# Patient Record
Sex: FEMALE | Race: WHITE | NOT HISPANIC OR LATINO | Employment: PART TIME | URBAN - METROPOLITAN AREA
[De-identification: names, ages, dates, MRNs, and addresses within clinical notes are randomized per-mention and may not be internally consistent; named-entity substitution may affect disease eponyms.]

---
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
## 2020-02-12 ENCOUNTER — OFFICE VISIT (OUTPATIENT)
Dept: OBSTETRICS AND GYNECOLOGY | Facility: CLINIC | Age: 20
End: 2020-02-12

## 2020-02-12 VITALS
DIASTOLIC BLOOD PRESSURE: 89 MMHG | WEIGHT: 246 LBS | HEART RATE: 104 BPM | BODY MASS INDEX: 34.44 KG/M2 | HEIGHT: 71 IN | SYSTOLIC BLOOD PRESSURE: 124 MMHG

## 2020-02-12 DIAGNOSIS — Z01.419 WELL WOMAN EXAM WITH ROUTINE GYNECOLOGICAL EXAM: Primary | ICD-10-CM

## 2020-02-12 LAB
B-HCG UR QL: NEGATIVE
INTERNAL NEGATIVE CONTROL: NEGATIVE
INTERNAL POSITIVE CONTROL: POSITIVE
Lab: NORMAL

## 2020-02-12 PROCEDURE — 81025 URINE PREGNANCY TEST: CPT | Performed by: OBSTETRICS & GYNECOLOGY

## 2020-02-12 PROCEDURE — 99395 PREV VISIT EST AGE 18-39: CPT | Performed by: OBSTETRICS & GYNECOLOGY

## 2020-02-12 RX ORDER — NORETHINDRONE ACETATE AND ETHINYL ESTRADIOL AND FERROUS FUMARATE 1MG-20(24)
1 KIT ORAL DAILY
Qty: 84 TABLET | Refills: 4 | Status: SHIPPED | OUTPATIENT
Start: 2020-02-12 | End: 2021-02-11

## 2020-02-12 NOTE — PROGRESS NOTES
Chief Complaint   Patient presents with   • Annual Exam        Sharita Leroy is a 19 y.o.  who presents for an annual examination.     Pap history:  Last pap: N/A  Prior abnormal paps: no  STDs  Sexually active: no  History of STDs: no  Has had HPV vaccine: yes  Contraception:  None currently. Patient's last menstrual period was 2019 (within days).   Denies h/o migraine with aura, VTE, family history of clotting disorder, no hypertension  Would like to restart [facility]    Screening for BRCA-   Is patient's family history significant for BRCA risk factors? no    Past Medical History:   Diagnosis Date   • Polycystic ovary syndrome      Past Surgical History:   Procedure Laterality Date   • ADENOIDECTOMY     • TONSILLECTOMY     • WISDOM TOOTH EXTRACTION       OB History    Para Term  AB Living   0 0 0 0 0 0   SAB TAB Ectopic Molar Multiple Live Births   0 0 0   0        Social History     Tobacco Use   • Smoking status: Never Smoker   • Smokeless tobacco: Never Used   Substance Use Topics   • Alcohol use: No   • Drug use: No     Family History   Problem Relation Age of Onset   • No Known Problems Father    • No Known Problems Mother    • No Known Problems Brother    • No Known Problems Sister    • No Known Problems Paternal Grandfather    • Breast cancer Paternal Grandmother         84   • No Known Problems Maternal Grandmother    • No Known Problems Maternal Grandfather    • Ovarian cancer Neg Hx    • Uterine cancer Neg Hx    • Colon cancer Neg Hx    • Deep vein thrombosis Neg Hx    • Pulmonary embolism Neg Hx      Current Outpatient Medications on File Prior to Visit   Medication Sig Dispense Refill   • [DISCONTINUED] Norethin Ace-Eth Estrad-FE (TAYTULLA) 1-20 MG-MCG(24) capsule Take 1 tablet by mouth Daily. 90 capsule 3     No current facility-administered medications on file prior to visit.      No Known Allergies     Review of Systems   Constitutional: Negative.    HENT: Negative.   "  Respiratory: Negative.    Cardiovascular: Negative.    Gastrointestinal: Negative.    Endocrine: Negative.    Genitourinary: Positive for menstrual problem. Hematuria: absent since June.   Musculoskeletal: Negative.    Skin: Negative.    Neurological: Negative.    Psychiatric/Behavioral: Negative.          OBJECTIVE:   Vitals:    02/12/20 1543   BP: 124/89   Pulse: 104   Weight: 112 kg (246 lb)   Height: 180.3 cm (71\")      Physical Exam   Constitutional: She is oriented to person, place, and time. She appears well-developed and well-nourished. No distress.   HENT:   Head: Normocephalic and atraumatic.   Eyes: EOM are normal. No scleral icterus.   Neck: Normal range of motion.   Cardiovascular: Normal rate and regular rhythm.   Pulmonary/Chest: Effort normal. No respiratory distress.   Abdominal: Soft. She exhibits no distension.   Musculoskeletal: Normal range of motion.   Neurological: She is alert and oriented to person, place, and time.   Skin: Skin is warm and dry. No rash noted. She is not diaphoretic. No erythema.   Psychiatric: She has a normal mood and affect. Her behavior is normal. Judgment and thought content normal.       ASSESSMENT/PLAN:     Annual well woman exam:  Cervical cancer screening:    Denies cervical dysplasia in past   HPV vaccination completed   The patient is due for a pap at age 20.    Screening guidelines discussed with patient  Breast cancer screening:    Clinical breast exam recommended for age 20-39 years every 1-3 years   Mammogram recommended starting age 40    Breast self awareness encouraged  STD Screening   Testing declines.    Contraception :   Desires oral contraceptive pill for menstrual regulation     Counseled on risks including but not limited to Nausea, vomiting, hypertension, headache, increased risk of venous thromboembolism.  She was encouraged if a side effect is arise she should stop the medication and seek medical attention.  Aware of safe sex precautions, what to " do with missed pills.   Family history    does not demonstrate need for genetics referral   Healthy lifestyle counseling:   return for routine annual checkups    BMI Counseling  Her BMI is classified as BMI 30.0-34.9        Classification: class I obesity.  Counseled on weight and its effects on menstrual cycle diane in women with PCOS.  Goal for 5% weight loss in next 3-4 months.         No follow-ups on file.

## 2021-02-16 ENCOUNTER — OFFICE VISIT (OUTPATIENT)
Dept: OBSTETRICS AND GYNECOLOGY | Facility: CLINIC | Age: 21
End: 2021-02-16

## 2021-02-16 VITALS
SYSTOLIC BLOOD PRESSURE: 131 MMHG | HEART RATE: 89 BPM | HEIGHT: 69 IN | DIASTOLIC BLOOD PRESSURE: 89 MMHG | BODY MASS INDEX: 33.92 KG/M2 | WEIGHT: 229 LBS

## 2021-02-16 DIAGNOSIS — Z01.419 WELL WOMAN EXAM WITH ROUTINE GYNECOLOGICAL EXAM: Primary | ICD-10-CM

## 2021-02-16 PROCEDURE — 99395 PREV VISIT EST AGE 18-39: CPT | Performed by: OBSTETRICS & GYNECOLOGY

## 2021-02-16 RX ORDER — ACETAMINOPHEN AND CODEINE PHOSPHATE 120; 12 MG/5ML; MG/5ML
1 SOLUTION ORAL DAILY
Qty: 84 TABLET | Refills: 4 | Status: SHIPPED | OUTPATIENT
Start: 2021-02-16 | End: 2022-02-22 | Stop reason: SDUPTHER

## 2021-02-16 NOTE — PROGRESS NOTES
Chief Complaint   Patient presents with   • Annual Exam        Sharita Leroy is a 20 y.o.  who presents for an annual examination.     Pap history:  Last pap: N/A  Prior abnormal paps: no  STDs  Sexually active: no  History of STDs: no  Has had HPV vaccine: yes  Contraception:   Patient's last menstrual period was 2021 (exact date).   Denies h/o migraine with aura, VTE, family history of clotting disorder, no hypertension  She is on PoP and would like to continue    Screening for BRCA-   Is patient's family history significant for BRCA risk factors? no    Past Medical History:   Diagnosis Date   • Polycystic ovary syndrome      Past Surgical History:   Procedure Laterality Date   • ADENOIDECTOMY     • TONSILLECTOMY     • WISDOM TOOTH EXTRACTION       OB History    Para Term  AB Living   0 0 0 0 0 0   SAB TAB Ectopic Molar Multiple Live Births   0 0 0   0        Social History     Tobacco Use   • Smoking status: Never Smoker   • Smokeless tobacco: Never Used   Substance Use Topics   • Alcohol use: Yes     Comment: sometimes   • Drug use: No     Family History   Problem Relation Age of Onset   • No Known Problems Father    • No Known Problems Mother    • No Known Problems Brother    • No Known Problems Sister    • No Known Problems Paternal Grandfather    • Breast cancer Paternal Grandmother         84   • No Known Problems Maternal Grandmother    • No Known Problems Maternal Grandfather    • Ovarian cancer Neg Hx    • Uterine cancer Neg Hx    • Colon cancer Neg Hx    • Deep vein thrombosis Neg Hx    • Pulmonary embolism Neg Hx      Current Outpatient Medications on File Prior to Visit   Medication Sig Dispense Refill   • [DISCONTINUED] NORETHINDRONE PO Take  by mouth.       No current facility-administered medications on file prior to visit.      No Known Allergies     Review of Systems   Constitutional: Negative.    HENT: Negative.    Respiratory: Negative.    Cardiovascular: Negative.   "  Gastrointestinal: Negative.    Endocrine: Negative.    Genitourinary: Negative for menstrual problem.   Musculoskeletal: Negative.    Skin: Negative.    Neurological: Negative.    Psychiatric/Behavioral: Negative.          OBJECTIVE:   Vitals:    02/16/21 1348   BP: 131/89   Pulse: 89   Weight: 104 kg (229 lb)   Height: 175.3 cm (69\")      Physical Exam   Constitutional: She is oriented to person, place, and time. She appears well-developed and well-nourished. No distress.   HENT:   Head: Normocephalic and atraumatic.   Eyes: No scleral icterus.   Neck: Normal range of motion.   Cardiovascular: Normal rate and regular rhythm.   Pulmonary/Chest: Effort normal. No respiratory distress.   Abdominal: Soft. She exhibits no distension.   Genitourinary: Vagina normal, uterus normal and cervix normal. There is no rash, tenderness or lesion on the right labia. There is no rash, tenderness or lesion on the left labia. Right adnexum displays no mass. Left adnexum displays no mass.   Musculoskeletal: Normal range of motion.   Neurological: She is alert and oriented to person, place, and time.   Skin: Skin is warm and dry. No rash noted. She is not diaphoretic. No erythema.   Psychiatric: Her behavior is normal. Judgment and thought content normal.       ASSESSMENT/PLAN:     Annual well woman exam:  Cervical cancer screening:    Denies cervical dysplasia in past   HPV vaccination completed   The patient is due for a pap today     Screening guidelines discussed with patient  Breast cancer screening:    Clinical breast exam recommended for age 20-39 years every 1-3 years   Mammogram recommended starting age 40    Breast self awareness encouraged  STD Screening   Testing declines.    Contraception :   Desires to continue minipill, having regular period.  Lasts 4-5 days  Family history    does not demonstrate need for genetics referral   Healthy lifestyle counseling:   return for routine annual checkups    BMI Counseling  Her BMI " is classified as BMI 30.0-34.9        Classification: class I obesity.  Counseled on weight and its effects on menstrual cycle diane in women with PCOS. She lost 15lbs since last annual. Congratulated on weight loss.  She reports improved her diet and has started exercising.        Return in about 1 year (around 2/16/2022) for Annual physical.

## 2021-02-19 LAB
CONV .: NORMAL
CYTOLOGIST CVX/VAG CYTO: NORMAL
CYTOLOGY CVX/VAG DOC CYTO: NORMAL
CYTOLOGY CVX/VAG DOC THIN PREP: NORMAL
DX ICD CODE: NORMAL
HIV 1 & 2 AB SER-IMP: NORMAL
OTHER STN SPEC: NORMAL
STAT OF ADQ CVX/VAG CYTO-IMP: NORMAL

## 2021-02-25 ENCOUNTER — TELEPHONE (OUTPATIENT)
Dept: OBSTETRICS AND GYNECOLOGY | Facility: CLINIC | Age: 21
End: 2021-02-25

## 2021-02-25 NOTE — TELEPHONE ENCOUNTER
----- Message from Ale Reynolds MD sent at 2/23/2021  9:00 AM EST -----  Please contact patient and let her know that her pap smear was normal. Thanks!    02/25/21  Called patient to inform results, no answer. Left a message to return call.    Pt should expect pap results from [facility] by mail.       -Closing encounter

## 2021-04-13 ENCOUNTER — TELEPHONE (OUTPATIENT)
Dept: OBSTETRICS AND GYNECOLOGY | Facility: CLINIC | Age: 21
End: 2021-04-13

## 2021-04-14 NOTE — TELEPHONE ENCOUNTER
04/14/21  Called regarding a question of her birth control (see doctor's message). No answer, left msg to return call.    04/16/21  Called regarding a question of her birth control (see doctor's message). No answer, left msg to return call.    04/19/21  When calling a female voice answered and stated this was a wrong number I gave her the number.

## 2022-02-22 ENCOUNTER — OFFICE VISIT (OUTPATIENT)
Dept: OBSTETRICS AND GYNECOLOGY | Facility: CLINIC | Age: 22
End: 2022-02-22

## 2022-02-22 VITALS
DIASTOLIC BLOOD PRESSURE: 86 MMHG | SYSTOLIC BLOOD PRESSURE: 146 MMHG | WEIGHT: 238 LBS | HEIGHT: 70 IN | BODY MASS INDEX: 34.07 KG/M2 | HEART RATE: 104 BPM

## 2022-02-22 DIAGNOSIS — Z01.419 WELL WOMAN EXAM WITH ROUTINE GYNECOLOGICAL EXAM: Primary | ICD-10-CM

## 2022-02-22 PROBLEM — E28.2 PCOS (POLYCYSTIC OVARIAN SYNDROME): Status: ACTIVE | Noted: 2017-07-24

## 2022-02-22 PROCEDURE — 99395 PREV VISIT EST AGE 18-39: CPT | Performed by: OBSTETRICS & GYNECOLOGY

## 2022-02-22 RX ORDER — ACETAMINOPHEN AND CODEINE PHOSPHATE 120; 12 MG/5ML; MG/5ML
1 SOLUTION ORAL DAILY
Qty: 84 TABLET | Refills: 4 | Status: SHIPPED | OUTPATIENT
Start: 2022-02-22 | End: 2022-04-18 | Stop reason: SDUPTHER

## 2022-02-22 NOTE — PROGRESS NOTES
Chief Complaint   Patient presents with   • Gynecologic Exam     here for annual exam.        Sharita Leroy is a 21 y.o.  who presents for an annual examination.     Pap history:  Last pap: 2021 NIL  Prior abnormal paps: no  STDs  Sexually active: no  History of STDs: no  Has had HPV vaccine: yes   Contraception:   Patient's last menstrual period was 2022.   Denies h/o migraine with aura, VTE, family history of clotting disorder, no hypertension  She is on PoP and would like to continue    Screening for BRCA-   Is patient's family history significant for BRCA risk factors? no    Past Medical History:   Diagnosis Date   • Polycystic ovary syndrome      Past Surgical History:   Procedure Laterality Date   • ADENOIDECTOMY     • TONSILLECTOMY     • WISDOM TOOTH EXTRACTION       OB History    Para Term  AB Living   0 0 0 0 0 0   SAB IAB Ectopic Molar Multiple Live Births   0 0 0   0        Social History     Tobacco Use   • Smoking status: Never Smoker   • Smokeless tobacco: Never Used   Substance Use Topics   • Alcohol use: Yes     Comment: sometimes   • Drug use: No     Family History   Problem Relation Age of Onset   • No Known Problems Father    • No Known Problems Mother    • No Known Problems Brother    • No Known Problems Sister    • No Known Problems Paternal Grandfather    • Breast cancer Paternal Grandmother         84   • No Known Problems Maternal Grandmother    • No Known Problems Maternal Grandfather    • Ovarian cancer Neg Hx    • Uterine cancer Neg Hx    • Colon cancer Neg Hx    • Deep vein thrombosis Neg Hx    • Pulmonary embolism Neg Hx      Current Outpatient Medications on File Prior to Visit   Medication Sig Dispense Refill   • [DISCONTINUED] norethindrone (MICRONOR) 0.35 MG tablet Take 1 tablet by mouth Daily. 84 tablet 4     No current facility-administered medications on file prior to visit.     No Known Allergies     Review of Systems   Constitutional: Negative.   "  HENT: Negative.    Respiratory: Negative.    Cardiovascular: Negative.    Gastrointestinal: Negative.    Endocrine: Negative.    Genitourinary: Negative for menstrual problem.   Musculoskeletal: Negative.    Skin: Negative.    Neurological: Negative.    Psychiatric/Behavioral: Negative.          OBJECTIVE:   Vitals:    02/22/22 0959 02/22/22 1036   BP: 148/96 146/86   Pulse: 97 104   Weight: 108 kg (238 lb)    Height: 177.8 cm (70\")       Physical Exam   Constitutional: She is oriented to person, place, and time. She appears well-developed and well-nourished. No distress.   HENT:   Head: Normocephalic and atraumatic.   Eyes: No scleral icterus.   Cardiovascular: Normal rate and regular rhythm.   Pulmonary/Chest: Effort normal. No respiratory distress.   Abdominal: Soft. She exhibits no distension.   Genitourinary: Vagina normal, uterus normal and cervix normal. There is no rash, tenderness or lesion on the right labia. There is no rash, tenderness or lesion on the left labia. Right adnexum displays no mass. Left adnexum displays no mass.   Musculoskeletal: Normal range of motion.   Neurological: She is alert and oriented to person, place, and time.   Skin: Skin is warm and dry. No rash noted. She is not diaphoretic. No erythema.   Psychiatric: Her behavior is normal. Judgment and thought content normal.       ASSESSMENT/PLAN:     Annual well woman exam:  Cervical cancer screening:    Denies cervical dysplasia in past   HPV vaccination completed   The patient is due for a pap in 2024    Screening guidelines discussed with patient  Breast cancer screening:    Clinical breast exam recommended for age 20-39 years every 1-3 years   Mammogram recommended starting age 40    Breast self awareness encouraged  STD Screening   Testing declines.    Contraception :   Desires to continue minipill, having regular period most of the time - this most recent period has lasted 7 days   Family history    does not demonstrate need for " genetics referral   Healthy lifestyle counseling:   return for routine annual checkups   HTN: Encouraged to establish with PCP     BMI Counseling  Her BMI is classified as BMI 30.0-34.9        Classification: class I obesity.  Counseled on weight and its effects on menstrual cycle diane in women with PCOS. She lost 7lbs since last annual. Congratulated on weight loss.        Return in about 1 year (around 2/22/2023) for Annual physical.

## 2022-04-18 RX ORDER — ACETAMINOPHEN AND CODEINE PHOSPHATE 120; 12 MG/5ML; MG/5ML
1 SOLUTION ORAL DAILY
Qty: 84 TABLET | Refills: 4 | Status: SHIPPED | OUTPATIENT
Start: 2022-04-18 | End: 2022-07-22

## 2022-07-22 ENCOUNTER — OFFICE VISIT (OUTPATIENT)
Dept: OBSTETRICS AND GYNECOLOGY | Facility: CLINIC | Age: 22
End: 2022-07-22

## 2022-07-22 VITALS
BODY MASS INDEX: 33.21 KG/M2 | SYSTOLIC BLOOD PRESSURE: 126 MMHG | HEART RATE: 76 BPM | HEIGHT: 70 IN | DIASTOLIC BLOOD PRESSURE: 89 MMHG | WEIGHT: 232 LBS

## 2022-07-22 DIAGNOSIS — E28.2 PCOS (POLYCYSTIC OVARIAN SYNDROME): Primary | ICD-10-CM

## 2022-07-22 DIAGNOSIS — N92.6 IRREGULAR MENSES: ICD-10-CM

## 2022-07-22 LAB
B-HCG UR QL: NEGATIVE
BILIRUB BLD-MCNC: NEGATIVE MG/DL
CLARITY, POC: ABNORMAL
COLOR UR: YELLOW
EXPIRATION DATE: NORMAL
GLUCOSE UR STRIP-MCNC: NEGATIVE MG/DL
INTERNAL NEGATIVE CONTROL: NEGATIVE
INTERNAL POSITIVE CONTROL: POSITIVE
KETONES UR QL: NEGATIVE
LEUKOCYTE EST, POC: NEGATIVE
Lab: NORMAL
NITRITE UR-MCNC: NEGATIVE MG/ML
PH UR: 7 [PH] (ref 5–8)
PROT UR STRIP-MCNC: NEGATIVE MG/DL
RBC # UR STRIP: ABNORMAL /UL
SP GR UR: 1.02 (ref 1–1.03)
UROBILINOGEN UR QL: NORMAL

## 2022-07-22 PROCEDURE — 81025 URINE PREGNANCY TEST: CPT | Performed by: OBSTETRICS & GYNECOLOGY

## 2022-07-22 PROCEDURE — 99213 OFFICE O/P EST LOW 20 MIN: CPT | Performed by: OBSTETRICS & GYNECOLOGY

## 2022-07-22 PROCEDURE — 81002 URINALYSIS NONAUTO W/O SCOPE: CPT | Performed by: OBSTETRICS & GYNECOLOGY

## 2022-07-22 RX ORDER — NORETHINDRONE ACETATE AND ETHINYL ESTRADIOL AND FERROUS FUMARATE 1MG-20(24)
1 KIT ORAL DAILY
Qty: 28 TABLET | Refills: 1 | Status: SHIPPED | OUTPATIENT
Start: 2022-07-22 | End: 2022-08-22

## 2022-07-22 NOTE — PROGRESS NOTES
SUBJECTIVE:   Chief Complaint   Patient presents with   • Follow-up     Pt presents today for irregular cycles         Sharita Leroy is a 22 y.o.  who presents for irregular cycles since around March. She seems to get them with only a four day break in between. They are pretty heavy: on heaviest day she has to change her tampon every 2 hours or 3 hours.  Denies bleeding through at night.  First two days she has heavier bleeding. Typically the bleeding lasts 5-6 days and then 3-4 days off and then restarts.      +crampign with last two periods.  This week it was pretty bad.  Takes ibuprofen and helps a little.  She is still on Micronor.  She was on Loestin in  and then PoP in     Never before sexually active.  No vaginal discharge.   She was told she had PCOS due to oligomenorrhea.  Denies any abnormal hair growth on face, some hair thinning.  Acne is worse.      Past Medical History:   Diagnosis Date   • Polycystic ovary syndrome      Past Surgical History:   Procedure Laterality Date   • ADENOIDECTOMY     • TONSILLECTOMY     • WISDOM TOOTH EXTRACTION       OB History    Para Term  AB Living   0 0 0 0 0 0   SAB IAB Ectopic Molar Multiple Live Births   0 0 0   0        Social History     Tobacco Use   • Smoking status: Never Smoker   • Smokeless tobacco: Never Used   Substance Use Topics   • Alcohol use: Yes     Comment: sometimes   • Drug use: No     Family History   Problem Relation Age of Onset   • No Known Problems Father    • No Known Problems Mother    • No Known Problems Brother    • No Known Problems Sister    • No Known Problems Paternal Grandfather    • Breast cancer Paternal Grandmother         84   • No Known Problems Maternal Grandmother    • No Known Problems Maternal Grandfather    • Ovarian cancer Neg Hx    • Uterine cancer Neg Hx    • Colon cancer Neg Hx    • Deep vein thrombosis Neg Hx    • Pulmonary embolism Neg Hx      Current Outpatient Medications on File Prior to  "Visit   Medication Sig Dispense Refill   • [DISCONTINUED] norethindrone (MICRONOR) 0.35 MG tablet Take 1 tablet by mouth Daily. 84 tablet 4     No current facility-administered medications on file prior to visit.     No Known Allergies     Review of Systems      OBJECTIVE:   Vitals:    07/22/22 1012   BP: 126/89   Pulse: 76   Weight: 105 kg (232 lb)   Height: 177.8 cm (70\")      Physical Exam  Exam conducted with a chaperone present.   Constitutional:       Appearance: She is well-developed.   HENT:      Head: Normocephalic and atraumatic.   Eyes:      General: No scleral icterus.  Cardiovascular:      Rate and Rhythm: Normal rate.   Pulmonary:      Effort: Pulmonary effort is normal. No respiratory distress.   Abdominal:      General: There is no distension.      Palpations: Abdomen is soft.      Tenderness: There is no abdominal tenderness. There is no guarding.   Genitourinary:     Labia:         Right: No rash, tenderness or lesion.         Left: No rash, tenderness or lesion.       Vagina: Vaginal discharge (dark brown, scant) present. No bleeding or lesions.      Cervix: No cervical motion tenderness or friability.      Uterus: Normal. Not enlarged and not tender.       Adnexa: Right adnexa normal.        Right: No mass or tenderness.          Left: No mass or tenderness.     Musculoskeletal:      Cervical back: Normal range of motion.   Skin:     General: Skin is warm and dry.   Neurological:      Mental Status: She is alert and oriented to person, place, and time.   Psychiatric:         Behavior: Behavior normal.         ASSESSMENT/PLAN:     ICD-10-CM ICD-9-CM   1. PCOS (polycystic ovarian syndrome)  E28.2 256.4   2. Irregular menses  N92.6 626.4       Reviewed possible causes of AUB  She was on PoP bc of BP which is within range today. Would like to do trial of CHC as bleeding has been so frequent. This is reasonable for one cycle and will have close BP follow up (est with PCP, too) next month with US with " us  Bleeding and pain precautions reviewed.     Orders Placed This Encounter   Procedures   • US Non-ob Transvaginal     Standing Status:   Future     Standing Expiration Date:   7/22/2023     Order Specific Question:   Reason for Exam:     Answer:   AUB, possible PCO   • TSH Rfx On Abnormal To Free T4     Order Specific Question:   Release to patient     Answer:   Immediate   • Follicle Stimulating Hormone     Order Specific Question:   Release to patient     Answer:   Immediate   • Estradiol     Order Specific Question:   Release to patient     Answer:   Immediate   • Testosterone - total     Order Specific Question:   Release to patient     Answer:   Immediate   • DHEA-Sulfate     Order Specific Question:   Release to patient     Answer:   Immediate   • Hemoglobin A1c     Order Specific Question:   Release to patient     Answer:   Immediate   • CBC (No Diff)     Order Specific Question:   Release to patient     Answer:   Immediate   • POC Urinalysis Dipstick     Order Specific Question:   Release to patient     Answer:   Immediate   • POC Pregnancy, Urine     Order Specific Question:   Release to patient     Answer:   Immediate       Return for Recheck with GYN US, GYN visit.

## 2022-07-23 LAB
DHEA-S SERPL-MCNC: 280 UG/DL (ref 110–431.7)
ERYTHROCYTE [DISTWIDTH] IN BLOOD BY AUTOMATED COUNT: 12.7 % (ref 11.7–15.4)
ESTRADIOL SERPL-MCNC: 19 PG/ML
FSH SERPL-ACNC: 6.5 MIU/ML
HBA1C MFR BLD: 5.8 % (ref 4.8–5.6)
HCT VFR BLD AUTO: 41.5 % (ref 34–46.6)
HGB BLD-MCNC: 13.5 G/DL (ref 11.1–15.9)
MCH RBC QN AUTO: 29.4 PG (ref 26.6–33)
MCHC RBC AUTO-ENTMCNC: 32.5 G/DL (ref 31.5–35.7)
MCV RBC AUTO: 90 FL (ref 79–97)
PLATELET # BLD AUTO: 323 X10E3/UL (ref 150–450)
RBC # BLD AUTO: 4.59 X10E6/UL (ref 3.77–5.28)
TESTOST SERPL-MCNC: 31 NG/DL (ref 13–71)
TSH SERPL DL<=0.005 MIU/L-ACNC: 2.75 UIU/ML (ref 0.45–4.5)
WBC # BLD AUTO: 10 X10E3/UL (ref 3.4–10.8)

## 2022-07-25 ENCOUNTER — TELEPHONE (OUTPATIENT)
Dept: OBSTETRICS AND GYNECOLOGY | Facility: CLINIC | Age: 22
End: 2022-07-25

## 2022-07-25 NOTE — TELEPHONE ENCOUNTER
----- Message from Ale Reynolds MD sent at 7/25/2022  9:11 AM EDT -----  Please let patient know that her testosterone, estradiol, FSH, and thyroid testing were within normal limits.  Her A1C was higher than 5 years ago at 5.8, in the prediabetic range. I would recommend follow up with her PCP for discussion of dietary/lifestyle changes and to continue to monitor this.  We can also discuss more at her follow up on 8/22. Thanks!

## 2022-08-22 ENCOUNTER — OFFICE VISIT (OUTPATIENT)
Dept: OBSTETRICS AND GYNECOLOGY | Facility: CLINIC | Age: 22
End: 2022-08-22

## 2022-08-22 VITALS
HEIGHT: 70 IN | SYSTOLIC BLOOD PRESSURE: 142 MMHG | DIASTOLIC BLOOD PRESSURE: 91 MMHG | BODY MASS INDEX: 33.44 KG/M2 | HEART RATE: 85 BPM | WEIGHT: 233.6 LBS

## 2022-08-22 DIAGNOSIS — E28.2 PCOS (POLYCYSTIC OVARIAN SYNDROME): ICD-10-CM

## 2022-08-22 DIAGNOSIS — R03.0 ELEVATED BLOOD PRESSURE READING: ICD-10-CM

## 2022-08-22 DIAGNOSIS — N93.9 ABNORMAL UTERINE BLEEDING (AUB): Primary | ICD-10-CM

## 2022-08-22 DIAGNOSIS — R73.09 ELEVATED HEMOGLOBIN A1C: ICD-10-CM

## 2022-08-22 PROCEDURE — 99213 OFFICE O/P EST LOW 20 MIN: CPT | Performed by: OBSTETRICS & GYNECOLOGY

## 2022-08-22 RX ORDER — ACETAMINOPHEN AND CODEINE PHOSPHATE 120; 12 MG/5ML; MG/5ML
1 SOLUTION ORAL DAILY
Qty: 84 TABLET | Refills: 4 | Status: SHIPPED | OUTPATIENT
Start: 2022-08-22 | End: 2023-08-22

## 2022-08-22 NOTE — PROGRESS NOTES
"SUBJECTIVE:   Chief Complaint   Patient presents with   • Follow-up     Pt presents today to go over u/s results         Sharita Leroy is a 22 y.o.  who presents for AUB. Reports she has had better bleeding - but still slightly irregular.  Took CHC for last month.  Has follow up with PCP this week, Dr. Dmitri Lemus at [facility]    Past Medical History:   Diagnosis Date   • Polycystic ovary syndrome      Past Surgical History:   Procedure Laterality Date   • ADENOIDECTOMY     • TONSILLECTOMY     • WISDOM TOOTH EXTRACTION       OB History    Para Term  AB Living   0 0 0 0 0 0   SAB IAB Ectopic Molar Multiple Live Births   0 0 0   0        Social History     Tobacco Use   • Smoking status: Never Smoker   • Smokeless tobacco: Never Used   Substance Use Topics   • Alcohol use: Yes     Comment: sometimes   • Drug use: No     Family History   Problem Relation Age of Onset   • No Known Problems Father    • No Known Problems Mother    • No Known Problems Brother    • No Known Problems Sister    • No Known Problems Paternal Grandfather    • Breast cancer Paternal Grandmother         84   • No Known Problems Maternal Grandmother    • No Known Problems Maternal Grandfather    • Ovarian cancer Neg Hx    • Uterine cancer Neg Hx    • Colon cancer Neg Hx    • Deep vein thrombosis Neg Hx    • Pulmonary embolism Neg Hx      Current Outpatient Medications on File Prior to Visit   Medication Sig Dispense Refill   • [DISCONTINUED] norethindrone-ethinyl estradiol-ferrous fumarate (LOESTIN 24 FE) 1-20 MG-MCG(24) per tablet Take 1 tablet by mouth Daily. 28 tablet 1     No current facility-administered medications on file prior to visit.     No Known Allergies     Review of Systems      OBJECTIVE:   Vitals:    22 1001   BP: 142/91   Pulse: 85   Weight: 106 kg (233 lb 9.6 oz)   Height: 177.8 cm (70\")      Physical Exam  Constitutional:       General: She is not in acute distress.     Appearance: She is " well-developed. She is not diaphoretic.   HENT:      Head: Normocephalic and atraumatic.   Eyes:      General: No scleral icterus.     Extraocular Movements: Extraocular movements intact.   Pulmonary:      Effort: Pulmonary effort is normal. No respiratory distress.   Skin:     General: Skin is warm and dry.   Neurological:      General: No focal deficit present.      Mental Status: She is alert and oriented to person, place, and time.   Psychiatric:         Mood and Affect: Mood normal.         Behavior: Behavior normal.         Thought Content: Thought content normal.         Judgment: Judgment normal.         ASSESSMENT/PLAN:     ICD-10-CM ICD-9-CM   1. Abnormal uterine bleeding (AUB)  N93.9 626.9   2. Elevated blood pressure reading  R03.0 796.2   3. Elevated hemoglobin A1c  R73.09 790.29   4. PCOS (polycystic ovarian syndrome)  E28.2 256.4       Reviewed US from today which suggests PCO appearing ovaries. In conjunction with her irregular cycle, PCOS is likely cause of AUB. We reviewed the implications of this.  She has had some elevations in BP and we tried a CHC again, but since her BP is elevated will switch back to PoP. Reviewed options for progestin only contraceptions and effects on bleeding including use of IUDs, Nexplanon. If she desires IUD will contact us  Reviewed use of metformin which may be helpful for A1C as well as cycle. As she has an appt with PCP this week will defer till that visit, but encouraged her to reach back out to us with questions.    Follow up with heavy or continued irregular bleeding. Can consider Provera withdrawal in between Micronor course as well      No orders of the defined types were placed in this encounter.      Return in about 6 months (around 2/22/2023).

## 2022-09-19 RX ORDER — NORETHINDRONE ACETATE AND ETHINYL ESTRADIOL, AND FERROUS FUMARATE 1MG-20(24)
KIT ORAL
Qty: 28 TABLET | Refills: 1 | OUTPATIENT
Start: 2022-09-19

## 2023-02-24 ENCOUNTER — OFFICE VISIT (OUTPATIENT)
Dept: OBSTETRICS AND GYNECOLOGY | Facility: CLINIC | Age: 23
End: 2023-02-24
Payer: COMMERCIAL

## 2023-02-24 VITALS
WEIGHT: 224.2 LBS | SYSTOLIC BLOOD PRESSURE: 130 MMHG | BODY MASS INDEX: 32.1 KG/M2 | DIASTOLIC BLOOD PRESSURE: 87 MMHG | HEIGHT: 70 IN | HEART RATE: 93 BPM

## 2023-02-24 DIAGNOSIS — Z01.419 WELL WOMAN EXAM WITH ROUTINE GYNECOLOGICAL EXAM: Primary | ICD-10-CM

## 2023-02-24 DIAGNOSIS — R73.09 ELEVATED HEMOGLOBIN A1C: ICD-10-CM

## 2023-02-24 LAB — HBA1C MFR BLD: 5.2 % (ref 4.8–5.6)

## 2023-02-24 PROCEDURE — 99395 PREV VISIT EST AGE 18-39: CPT | Performed by: OBSTETRICS & GYNECOLOGY

## 2023-02-24 NOTE — PROGRESS NOTES
Chief Complaint   Patient presents with   • Gynecologic Exam        Sharita Leroy is a 22 y.o.  who presents for an annual examination.   She stopped her minipill in January. Had one period since then, a couple weeks ago.    Pap history:  Last pap: 2021 NIL  Prior abnormal paps: no  STDs  Sexually active: no  History of STDs: no  Has had HPV vaccine: yes   Contraception:   Patient's last menstrual period was 2023. Would like to stop contraception  Denies need for contraception, chance of pregnancy   Denies h/o migraine with aura, VTE, family history of clotting disorder, no hypertension  She is on PoP and would like to continue    Screening for BRCA-   Is patient's family history significant for BRCA risk factors? no    Past Medical History:   Diagnosis Date   • Polycystic ovary syndrome      Past Surgical History:   Procedure Laterality Date   • ADENOIDECTOMY     • TONSILLECTOMY     • WISDOM TOOTH EXTRACTION       OB History    Para Term  AB Living   0 0 0 0 0 0   SAB IAB Ectopic Molar Multiple Live Births   0 0 0   0        Social History     Tobacco Use   • Smoking status: Never   • Smokeless tobacco: Never   Substance Use Topics   • Alcohol use: Yes     Comment: sometimes   • Drug use: No     Family History   Problem Relation Age of Onset   • No Known Problems Father    • No Known Problems Mother    • No Known Problems Brother    • No Known Problems Sister    • No Known Problems Paternal Grandfather    • Breast cancer Paternal Grandmother         84   • No Known Problems Maternal Grandmother    • No Known Problems Maternal Grandfather    • Ovarian cancer Neg Hx    • Uterine cancer Neg Hx    • Colon cancer Neg Hx    • Deep vein thrombosis Neg Hx    • Pulmonary embolism Neg Hx      Current Outpatient Medications on File Prior to Visit   Medication Sig Dispense Refill   • metFORMIN (GLUCOPHAGE) 500 MG tablet      • norethindrone (MICRONOR) 0.35 MG tablet Take 1 tablet by mouth  "Daily. 84 tablet 4     No current facility-administered medications on file prior to visit.     No Known Allergies     Review of Systems   Constitutional: Negative.    HENT: Negative.    Respiratory: Negative.    Cardiovascular: Negative.    Gastrointestinal: Negative.    Endocrine: Negative.    Genitourinary: Negative for menstrual problem.   Musculoskeletal: Negative.    Skin: Negative.    Neurological: Negative.    Psychiatric/Behavioral: Negative.          OBJECTIVE:   Vitals:    02/24/23 0854   BP: 130/87   Pulse: 93   Weight: 102 kg (224 lb 3.2 oz)   Height: 177.8 cm (70\")      Physical Exam   Constitutional: She is oriented to person, place, and time. She appears well-developed and well-nourished. No distress.   HENT:   Head: Normocephalic and atraumatic.   Eyes: No scleral icterus.   Cardiovascular: Normal rate and regular rhythm.   Pulmonary/Chest: Effort normal. No respiratory distress.   Abdominal: Soft. She exhibits no distension.   Genitourinary: Vagina normal, uterus normal and cervix normal. There is no rash, tenderness or lesion on the right labia. There is no rash, tenderness or lesion on the left labia. Right adnexum displays no mass. Left adnexum displays no mass.   Musculoskeletal: Normal range of motion.   Neurological: She is alert and oriented to person, place, and time.   Skin: Skin is warm and dry. No rash noted. She is not diaphoretic. No erythema.   Psychiatric: Her behavior is normal. Judgment and thought content normal.       ASSESSMENT/PLAN:     Annual well woman exam:  Cervical cancer screening:    Denies cervical dysplasia in past   HPV vaccination completed   The patient is due for a pap in 2024    Screening guidelines discussed with patient  Breast cancer screening:    Clinical breast exam recommended for age 20-39 years every 1-3 years   Mammogram recommended starting age 40    Breast self awareness encouraged  STD Screening   Testing declines.    Contraception :   Stopped " minipill. Would like to see how cycle goes. Recommend follow up with absent menses for 2-3 cycles, irregular or heavy bleeding or other concerns. Can consider provera withdrawal if cycles are irregular  Family history    does not demonstrate need for genetics referral   Healthy lifestyle counseling:   return for routine annual checkups   Elevated A1C - est'd with PCP, started Metformin in August.  Would like to repeat A1C as has not been repeated since. Did other metabolic panel with PCP        Return in about 1 year (around 2/24/2024) for Annual physical.

## 2023-03-08 ENCOUNTER — APPOINTMENT (OUTPATIENT)
Dept: CT IMAGING | Facility: HOSPITAL | Age: 23
End: 2023-03-08
Payer: COMMERCIAL

## 2023-03-08 ENCOUNTER — HOSPITAL ENCOUNTER (EMERGENCY)
Facility: HOSPITAL | Age: 23
Discharge: HOME | End: 2023-03-08
Attending: EMERGENCY MEDICINE | Admitting: EMERGENCY MEDICINE
Payer: COMMERCIAL

## 2023-03-08 VITALS
HEART RATE: 76 BPM | RESPIRATION RATE: 14 BRPM | DIASTOLIC BLOOD PRESSURE: 80 MMHG | SYSTOLIC BLOOD PRESSURE: 120 MMHG | TEMPERATURE: 98.4 F | OXYGEN SATURATION: 99 %

## 2023-03-08 DIAGNOSIS — S09.90XA MINOR HEAD INJURY, INITIAL ENCOUNTER: ICD-10-CM
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
## 2023-03-08 DIAGNOSIS — S16.1XXA ACUTE CERVICAL MYOFASCIAL STRAIN, INITIAL ENCOUNTER: ICD-10-CM

## 2023-03-08 PROCEDURE — 99282 EMERGENCY DEPT VISIT SF MDM: CPT

## 2023-03-08 PROCEDURE — 70450 CT HEAD/BRAIN W/O DYE: CPT

## 2023-03-08 PROCEDURE — 72125 CT NECK SPINE W/O DYE: CPT

## 2023-03-08 RX ORDER — METHOCARBAMOL 500 MG/1
1000 TABLET, FILM COATED ORAL 4 TIMES DAILY
Qty: 40 TABLET | Refills: 0 | Status: SHIPPED | OUTPATIENT
Start: 2023-03-08

## 2023-03-08 RX ORDER — NAPROXEN 500 MG/1
500 TABLET ORAL 2 TIMES DAILY WITH MEALS
Qty: 20 TABLET | Refills: 0 | Status: SHIPPED | OUTPATIENT
Start: 2023-03-08
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
## 2023-03-09 NOTE — DISCHARGE INSTRUCTIONS
Home, rest, medicine instructed, apply heat or ice to affected areas.  Home medicine as prescribed, follow up with PCP for recheck. Return to care if symptoms worsen or with further concerns.

## 2024-03-06 ENCOUNTER — OFFICE VISIT (OUTPATIENT)
Dept: OBSTETRICS AND GYNECOLOGY | Facility: CLINIC | Age: 24
End: 2024-03-06
Payer: COMMERCIAL

## 2024-03-06 VITALS
HEIGHT: 70 IN | BODY MASS INDEX: 35.9 KG/M2 | SYSTOLIC BLOOD PRESSURE: 122 MMHG | HEART RATE: 76 BPM | DIASTOLIC BLOOD PRESSURE: 83 MMHG | WEIGHT: 250.8 LBS

## 2024-03-06 DIAGNOSIS — R63.5 WEIGHT GAIN: ICD-10-CM

## 2024-03-06 DIAGNOSIS — N93.9 ABNORMAL UTERINE BLEEDING (AUB): ICD-10-CM

## 2024-03-06 DIAGNOSIS — Z01.419 WELL WOMAN EXAM WITH ROUTINE GYNECOLOGICAL EXAM: Primary | ICD-10-CM

## 2024-03-06 LAB
B-HCG UR QL: NEGATIVE
EXPIRATION DATE: NORMAL
INTERNAL NEGATIVE CONTROL: NEGATIVE
INTERNAL POSITIVE CONTROL: POSITIVE
Lab: NORMAL

## 2024-03-06 RX ORDER — MEDROXYPROGESTERONE ACETATE 10 MG/1
TABLET ORAL
Qty: 30 TABLET | Refills: 4 | Status: SHIPPED | OUTPATIENT
Start: 2024-03-06

## 2024-03-06 NOTE — PROGRESS NOTES
Chief Complaint   Patient presents with    Annual Exam     Last ae: 2023  Last pap: 2021 NIL    Menstrual Problem     Has not had a cycle since August, irregular periods in the past, states from 2023- Aug 2023 they were regular and then has not had one since        Sharita Leroy is a 23 y.o.  who presents for an annual examination.   She stopped her minipill last winter and then was regular till about August then no period since.    Not sexually active.      Pap history:  Last pap: 2021 NIL  Prior abnormal paps: no  STDs  Sexually active: no  History of STDs: no  Has had HPV vaccine: yes   Contraception:   Patient's last menstrual period was 2023 (approximate).   Not considering conception in the near future, but not interested in contraception    Screening for BRCA-   Is patient's family history significant for BRCA risk factors? no    Past Medical History:   Diagnosis Date    Hypertension     Migraine     Polycystic ovary syndrome      Past Surgical History:   Procedure Laterality Date    ADENOIDECTOMY      TONSILLECTOMY      WISDOM TOOTH EXTRACTION       OB History    Para Term  AB Living   0 0 0 0 0 0   SAB IAB Ectopic Molar Multiple Live Births   0 0 0   0        Social History     Tobacco Use    Smoking status: Never    Smokeless tobacco: Never   Vaping Use    Vaping status: Never Used   Substance Use Topics    Alcohol use: Not Currently     Comment: sometimes    Drug use: No     Family History   Problem Relation Age of Onset    No Known Problems Father     No Known Problems Mother     No Known Problems Brother     No Known Problems Sister     No Known Problems Paternal Grandfather     Breast cancer Paternal Grandmother         84    Diabetes Maternal Grandmother     No Known Problems Maternal Grandfather     Ovarian cancer Neg Hx     Uterine cancer Neg Hx     Colon cancer Neg Hx     Deep vein thrombosis Neg Hx     Pulmonary embolism Neg Hx      Current Outpatient  "Medications on File Prior to Visit   Medication Sig Dispense Refill    [DISCONTINUED] metFORMIN (GLUCOPHAGE) 500 MG tablet       [DISCONTINUED] methocarbamol (ROBAXIN) 500 MG tablet Take 2 tablets by mouth 4 (Four) Times a Day. 40 tablet 0    [DISCONTINUED] naproxen (NAPROSYN) 500 MG tablet Take 1 tablet by mouth 2 (Two) Times a Day With Meals. 20 tablet 0    [DISCONTINUED] norethindrone (MICRONOR) 0.35 MG tablet Take 1 tablet by mouth Daily. 84 tablet 4     No current facility-administered medications on file prior to visit.     No Known Allergies     Review of Systems   Constitutional: Negative.    HENT: Negative.     Respiratory: Negative.     Cardiovascular: Negative.    Gastrointestinal: Negative.    Endocrine: Negative.    Genitourinary:  Negative for menstrual problem.   Musculoskeletal: Negative.    Skin: Negative.    Neurological: Negative.    Psychiatric/Behavioral: Negative.           OBJECTIVE:   Vitals:    03/06/24 1116   BP: 122/83   Pulse: 76   Weight: 114 kg (250 lb 12.8 oz)   Height: 177.8 cm (70\")      Physical Exam   Constitutional: She is oriented to person, place, and time. She appears well-developed and well-nourished. No distress.   HENT:   Head: Normocephalic and atraumatic.   Eyes: No scleral icterus.   Cardiovascular: Normal rate and regular rhythm.   Pulmonary/Chest: Effort normal. No respiratory distress.   Abdominal: Soft. She exhibits no distension.   Genitourinary: Vagina normal, uterus normal and cervix normal. There is no rash, tenderness or lesion on the right labia. There is no rash, tenderness or lesion on the left labia. Right adnexum displays no mass. Left adnexum displays no mass.   Musculoskeletal: Normal range of motion.   Neurological: She is alert and oriented to person, place, and time.   Skin: Skin is warm and dry. No rash noted. She is not diaphoretic. No erythema.   Psychiatric: Her behavior is normal. Judgment and thought content normal.       ASSESSMENT/PLAN: "   Diagnoses and all orders for this visit:    1. Well woman exam with routine gynecological exam (Primary)  -     IGP, Rfx Aptima HPV ASCU  -     POC Pregnancy, Urine    2. Abnormal uterine bleeding (AUB)  -     TSH Rfx On Abnormal To Free T4  -     Hemoglobin A1c  -     Testosterone    3. Weight gain  -     TSH Rfx On Abnormal To Free T4  -     Hemoglobin A1c  -     Testosterone    Other orders  -     medroxyPROGESTERone (Provera) 10 MG tablet; Take 10mg daily x 10 days as needed each month for menses  Dispense: 30 tablet; Refill: 4        Annual well woman exam:  Cervical cancer screening:    Denies cervical dysplasia in past   HPV vaccination completed   The patient is due for a pap today    Screening guidelines discussed with patient  Breast cancer screening:    Clinical breast exam recommended for age 20-39 years every 1-3 years   Mammogram recommended starting age 40    Breast self awareness encouraged  STD Screening   Testing not indicated  Contraception :   Stopped minipill.Would like to do Provera withdrawal  Family history    does not demonstrate need for genetics referral   Healthy lifestyle counseling:   return for routine annual checkups   Elevated A1C - returned to baseline last year.  Will repeat labs due to unexpected weight gain and cycle irregularities.         Return in about 1 year (around 3/6/2025) for Annual physical.

## 2024-03-07 LAB
HBA1C MFR BLD: 5.6 % (ref 4.8–5.6)
TESTOST SERPL-MCNC: 41 NG/DL (ref 13–71)
TSH SERPL DL<=0.005 MIU/L-ACNC: 3.55 UIU/ML (ref 0.45–4.5)

## 2024-03-11 LAB
CONV .: NORMAL
CYTOLOGIST CVX/VAG CYTO: NORMAL
CYTOLOGY CVX/VAG DOC CYTO: NORMAL
CYTOLOGY CVX/VAG DOC THIN PREP: NORMAL
DX ICD CODE: NORMAL
Lab: NORMAL
Lab: NORMAL
OTHER STN SPEC: NORMAL
STAT OF ADQ CVX/VAG CYTO-IMP: NORMAL

## 2025-03-01 NOTE — PROGRESS NOTES
GYN ANNUAL EXAM     Chief Complaint   Patient presents with    Annual Exam     AE and pap today.Irregular periods.Has PCOS.       ANGELY Sheriff is a 24 y.o. female who presents for annual well woman exam. She is a patient of Dr. Reynolds.     OB History          0    Para   0    Term   0       0    AB   0    Living   0         SAB   0    IAB   0    Ectopic   0    Molar        Multiple   0    Live Births                    SUBJECTIVE    MENSTRUAL & SEXUAL HEALTH  LMP: Patient's last menstrual period was 2025.  Menses regularity: irregular -she most recently went to 6 months without menses  Dysmenorrhea: none  Cyclic symptoms: none  Current contraception: none  Last pap: , Normal PAP  History of abnormal pap: no  History of STD: No  Family history of cancer: denies       Family history of breast cancer: no  Performs SBE: performs monthly.  Incontinence: Patient reports that she is not currently experiencing any symptoms of urinary incontinence.   Dyspareunia: no    Past Medical History:   Diagnosis Date    Hypertension     Migraine     Polycystic ovary syndrome        Past Surgical History:   Procedure Laterality Date    ADENOIDECTOMY      TONSILLECTOMY      WISDOM TOOTH EXTRACTION           Current Outpatient Medications:     medroxyPROGESTERone (Provera) 10 MG tablet, Take 1 tablet by mouth Daily for 10 days., Disp: 10 tablet, Rfl: 11    metFORMIN (GLUCOPHAGE) 500 MG tablet, Take 0.5 tablets by mouth Daily With Breakfast for 30 days, THEN 1 tablet Daily With Breakfast for 30 days, THEN 2 tablets Daily With Breakfast for 30 days., Disp: 100 tablet, Rfl: 4    No Known Allergies    Social History     Tobacco Use    Smoking status: Never    Smokeless tobacco: Never   Vaping Use    Vaping status: Never Used   Substance Use Topics    Alcohol use: Not Currently     Comment: sometimes    Drug use: No       Family History   Problem Relation Age of Onset    No Known Problems Father     No Known  "Problems Mother     No Known Problems Brother     No Known Problems Sister     No Known Problems Paternal Grandfather     Breast cancer Paternal Grandmother         84    Diabetes Maternal Grandmother     No Known Problems Maternal Grandfather     Ovarian cancer Neg Hx     Uterine cancer Neg Hx     Colon cancer Neg Hx     Deep vein thrombosis Neg Hx     Pulmonary embolism Neg Hx        Review of Systems   Constitutional:  Negative for fatigue, unexpected weight gain and unexpected weight loss.   Gastrointestinal:  Negative for abdominal pain.   Genitourinary:  Negative for decreased libido, difficulty urinating, dyspareunia, dysuria, pelvic pain, pelvic pressure, urgency, urinary incontinence and vaginal discharge.   All other systems reviewed and are negative.      OBJECTIVE    /82   Ht 177.8 cm (70\")   Wt 117 kg (258 lb)   LMP 02/22/2025   BMI 37.02 kg/m²     Physical Exam  Constitutional:       General: She is awake. She is not in acute distress.     Appearance: Normal appearance. She is well-developed and well-groomed. She is not ill-appearing.   Genitourinary:      Vulva, bladder and urethral meatus normal.      Right Labia: No rash, tenderness, lesions or skin changes.     Left Labia: No tenderness, lesions, skin changes or rash.     No labial fusion noted.      No inguinal adenopathy present in the right or left side.     No vaginal discharge, erythema, tenderness, bleeding, ulceration or granulation tissue.      No vaginal prolapse present.     No vaginal atrophy present.     No cervical discharge, friability, lesion, polyp, eversion or elongation.      Uterus is not enlarged, tender or prolapsed.      Pelvic exam was performed with patient in the lithotomy position.   Breasts:     Breasts are symmetrical.      Breasts are soft.     Right: Normal.      Left: Normal.   HENT:      Head: Normocephalic and atraumatic.   Eyes:      General: No scleral icterus.     Conjunctiva/sclera: Conjunctivae " normal.   Cardiovascular:      Rate and Rhythm: Normal rate and regular rhythm.      Heart sounds: Normal heart sounds.   Pulmonary:      Effort: Pulmonary effort is normal.      Breath sounds: Normal breath sounds.   Abdominal:      Palpations: Abdomen is soft.      Hernia: There is no hernia in the left inguinal area or right inguinal area.   Musculoskeletal:         General: Normal range of motion.      Cervical back: Normal range of motion.   Lymphadenopathy:      Lower Body: No right inguinal adenopathy. No left inguinal adenopathy.   Neurological:      Mental Status: She is alert.   Skin:     General: Skin is warm and dry.      Coloration: Skin is not jaundiced or pale.   Psychiatric:         Behavior: Behavior normal. Behavior is cooperative.   Vitals reviewed.         ASSESSMENT     Diagnoses and all orders for this visit:    1. Encounter for gynecological examination without abnormal finding (Primary)  -     IGP,CtNgTv,rfx Apt HPV All    2. PCOS (polycystic ovarian syndrome)  -     medroxyPROGESTERone (Provera) 10 MG tablet; Take 1 tablet by mouth Daily for 10 days.  Dispense: 10 tablet; Refill: 11  -     metFORMIN (GLUCOPHAGE) 500 MG tablet; Take 0.5 tablets by mouth Daily With Breakfast for 30 days, THEN 1 tablet Daily With Breakfast for 30 days, THEN 2 tablets Daily With Breakfast for 30 days.  Dispense: 100 tablet; Refill: 4         PLAN   WELL WOMAN EXAM: Pap smear collected today. Recommend MVI daily.    CONTRACEPTION: none.   PCOS: Discussed importance of menses at minimum every 3 months to help reduce risk of endometrial cancer later in life. She would like to have Provera should she need to use it.  Prescription sent.  Also discussed weight loss impact on menses.  We discussed use of metformin to decrease insulin resistance in PCOS.  Education provided on ramp up dosing.  SMOKING STATUS: non smoker.  BREAST HEALTH: Encouraged annual mammogram screening starting at age 40. Instructed on how to  perform SBE. Encouraged breast health self awareness.  EXERCISE: Encouraged 150 minutes of exercise per week if not medially contraindicated.   BMI: Body mass index is 37.02 kg/m².     Return in about 1 year (around 3/7/2026) for annual exam or as needed before.    I spent 30 minutes caring for Sharita on this date of service. This time includes time spent by me in the following activities: preparing for the visit, reviewing tests, performing a medically appropriate examination and/or evaluation, counseling and educating the patient/family/caregiver, referring and communicating with other health care professionals, documenting information in the medical record, independently interpreting results and communicating that information with the patient/family/caregiver, care coordination, ordering medications, ordering test(s), ordering procedure(s), obtaining a separately obtained history, and reviewing a separately obtained history.    Romina Iraheta CNM  3/7/2025  11:32 EST

## 2025-03-07 ENCOUNTER — OFFICE VISIT (OUTPATIENT)
Dept: OBSTETRICS AND GYNECOLOGY | Facility: CLINIC | Age: 25
End: 2025-03-07
Payer: COMMERCIAL

## 2025-03-07 VITALS
HEIGHT: 70 IN | BODY MASS INDEX: 36.94 KG/M2 | WEIGHT: 258 LBS | SYSTOLIC BLOOD PRESSURE: 129 MMHG | DIASTOLIC BLOOD PRESSURE: 82 MMHG

## 2025-03-07 DIAGNOSIS — Z01.419 ENCOUNTER FOR GYNECOLOGICAL EXAMINATION WITHOUT ABNORMAL FINDING: Primary | ICD-10-CM

## 2025-03-07 DIAGNOSIS — E28.2 PCOS (POLYCYSTIC OVARIAN SYNDROME): ICD-10-CM

## 2025-03-07 RX ORDER — MEDROXYPROGESTERONE ACETATE 10 MG
10 TABLET ORAL DAILY
Qty: 10 TABLET | Refills: 11 | Status: SHIPPED | OUTPATIENT
Start: 2025-03-07 | End: 2025-03-17

## 2025-03-11 LAB
C TRACH RRNA CVX QL NAA+PROBE: NEGATIVE
CONV .: NORMAL
CYTOLOGIST CVX/VAG CYTO: NORMAL
CYTOLOGY CVX/VAG DOC CYTO: NORMAL
CYTOLOGY CVX/VAG DOC THIN PREP: NORMAL
DX ICD CODE: NORMAL
N GONORRHOEA RRNA CVX QL NAA+PROBE: NEGATIVE
OTHER STN SPEC: NORMAL
SERVICE CMNT-IMP: NORMAL
STAT OF ADQ CVX/VAG CYTO-IMP: NORMAL
T VAGINALIS RRNA SPEC QL NAA+PROBE: NEGATIVE